# Patient Record
Sex: FEMALE | Race: WHITE | Employment: FULL TIME | URBAN - METROPOLITAN AREA
[De-identification: names, ages, dates, MRNs, and addresses within clinical notes are randomized per-mention and may not be internally consistent; named-entity substitution may affect disease eponyms.]

---

## 2018-01-01 PROCEDURE — 99282 EMERGENCY DEPT VISIT SF MDM: CPT

## 2018-01-02 ENCOUNTER — HOSPITAL ENCOUNTER (EMERGENCY)
Age: 33
Discharge: HOME | End: 2018-01-02
Attending: EMERGENCY MEDICINE
Payer: OTHER GOVERNMENT

## 2018-01-02 VITALS
HEIGHT: 66 IN | HEART RATE: 101 BPM | TEMPERATURE: 97.7 F | RESPIRATION RATE: 16 BRPM | WEIGHT: 145 LBS | OXYGEN SATURATION: 100 % | BODY MASS INDEX: 23.3 KG/M2 | DIASTOLIC BLOOD PRESSURE: 86 MMHG | SYSTOLIC BLOOD PRESSURE: 130 MMHG

## 2018-01-02 DIAGNOSIS — G43.001 MIGRAINE WITHOUT AURA AND WITH STATUS MIGRAINOSUS, NOT INTRACTABLE: Primary | ICD-10-CM

## 2018-01-02 PROCEDURE — 74011250636 HC RX REV CODE- 250/636: Performed by: EMERGENCY MEDICINE

## 2018-01-02 PROCEDURE — 96372 THER/PROPH/DIAG INJ SC/IM: CPT

## 2018-01-02 RX ORDER — SUMATRIPTAN 100 MG/1
100 TABLET, FILM COATED ORAL
COMMUNITY

## 2018-01-02 RX ORDER — SUMATRIPTAN 6 MG/.5ML
6 INJECTION, SOLUTION SUBCUTANEOUS ONCE
Status: COMPLETED | OUTPATIENT
Start: 2018-01-02 | End: 2018-01-02

## 2018-01-02 RX ORDER — MORPHINE SULFATE 4 MG/ML
4 INJECTION, SOLUTION INTRAMUSCULAR; INTRAVENOUS
Status: COMPLETED | OUTPATIENT
Start: 2018-01-02 | End: 2018-01-02

## 2018-01-02 RX ORDER — SUMATRIPTAN 6 MG/.5ML
INJECTION, SOLUTION SUBCUTANEOUS
Status: DISCONTINUED
Start: 2018-01-02 | End: 2018-01-02 | Stop reason: HOSPADM

## 2018-01-02 RX ORDER — HYDROCODONE BITARTRATE AND ACETAMINOPHEN 5; 325 MG/1; MG/1
1-2 TABLET ORAL
Qty: 15 TAB | Refills: 0 | Status: SHIPPED | OUTPATIENT
Start: 2018-01-02

## 2018-01-02 RX ADMIN — MORPHINE SULFATE 4 MG: 4 INJECTION, SOLUTION INTRAMUSCULAR; INTRAVENOUS at 00:39

## 2018-01-02 RX ADMIN — SUMATRIPTAN SUCCINATE 6 MG: 6 INJECTION, SOLUTION SUBCUTANEOUS at 01:17

## 2018-01-02 NOTE — DISCHARGE INSTRUCTIONS
Migraine Headache: Care Instructions  Your Care Instructions  Migraines are painful, throbbing headaches that often start on one side of the head. They may cause nausea and vomiting and make you sensitive to light, sound, or smell. Without treatment, migraines can last from 4 hours to a few days. Medicines can help prevent migraines or stop them after they have started. Your doctor can help you find which ones work best for you. Follow-up care is a key part of your treatment and safety. Be sure to make and go to all appointments, and call your doctor if you are having problems. It's also a good idea to know your test results and keep a list of the medicines you take. How can you care for yourself at home? · Do not drive if you have taken a prescription pain medicine. · Rest in a quiet, dark room until your headache is gone. Close your eyes, and try to relax or go to sleep. Don't watch TV or read. · Put a cold, moist cloth or cold pack on the painful area for 10 to 20 minutes at a time. Put a thin cloth between the cold pack and your skin. · Use a warm, moist towel or a heating pad set on low to relax tight shoulder and neck muscles. · Have someone gently massage your neck and shoulders. · Take your medicines exactly as prescribed. Call your doctor if you think you are having a problem with your medicine. You will get more details on the specific medicines your doctor prescribes. · Be careful not to take pain medicine more often than the instructions allow. You could get worse or more frequent headaches when the medicine wears off. To prevent migraines  · Keep a headache diary so you can figure out what triggers your headaches. Avoiding triggers may help you prevent headaches. Record when each headache began, how long it lasted, and what the pain was like.  (Was it throbbing, aching, stabbing, or dull?) Write down any other symptoms you had with the headache, such as nausea, flashing lights or dark spots, or sensitivity to bright light or loud noise. Note if the headache occurred near your period. List anything that might have triggered the headache. Triggers may include certain foods (chocolate, cheese, wine) or odors, smoke, bright light, stress, or lack of sleep. · If your doctor has prescribed medicine for your migraines, take it as directed. You may have medicine that you take only when you get a migraine and medicine that you take all the time to help prevent migraines. ¨ If your doctor has prescribed medicine for when you get a headache, take it at the first sign of a migraine, unless your doctor has given you other instructions. ¨ If your doctor has prescribed medicine to prevent migraines, take it exactly as prescribed. Call your doctor if you think you are having a problem with your medicine. · Find healthy ways to deal with stress. Migraines are most common during or right after stressful times. Take time to relax before and after you do something that has caused a migraine in the past.  · Try to keep your muscles relaxed by keeping good posture. Check your jaw, face, neck, and shoulder muscles for tension. Try to relax them. When you sit at a desk, change positions often. And make sure to stretch for 30 seconds each hour. · Get plenty of sleep and exercise. · Eat meals on a regular schedule. Avoid foods and drinks that often trigger migraines. These include chocolate, alcohol (especially red wine and port), aspartame, monosodium glutamate (MSG), and some additives found in foods (such as hot dogs, rangel, cold cuts, aged cheeses, and pickled foods). · Limit caffeine. Don't drink too much coffee, tea, or soda. But don't quit caffeine suddenly. That can also give you migraines. · Do not smoke or allow others to smoke around you. If you need help quitting, talk to your doctor about stop-smoking programs and medicines. These can increase your chances of quitting for good.   · If you are taking birth control pills or hormone therapy, talk to your doctor about whether they are triggering your migraines. When should you call for help? Call 911 anytime you think you may need emergency care. For example, call if:  ? · You have signs of a stroke. These may include:  ¨ Sudden numbness, paralysis, or weakness in your face, arm, or leg, especially on only one side of your body. ¨ Sudden vision changes. ¨ Sudden trouble speaking. ¨ Sudden confusion or trouble understanding simple statements. ¨ Sudden problems with walking or balance. ¨ A sudden, severe headache that is different from past headaches. ?Call your doctor now or seek immediate medical care if:  ? · You have new or worse nausea and vomiting. ? · You have a new or higher fever. ? · Your headache gets much worse. ? Watch closely for changes in your health, and be sure to contact your doctor if:  ? · You are not getting better after 2 days (48 hours). Where can you learn more? Go to http://brandyn-fernie.info/. Enter V534 in the search box to learn more about \"Migraine Headache: Care Instructions. \"  Current as of: October 14, 2016  Content Version: 11.4  © 2839-9901 [employer]. Care instructions adapted under license by [employer] (which disclaims liability or warranty for this information). If you have questions about a medical condition or this instruction, always ask your healthcare professional. [place] [address] any warranty or liability for your use of this information.

## 2018-01-02 NOTE — ED PROVIDER NOTES
EMERGENCY DEPARTMENT HISTORY AND PHYSICAL EXAM    12:12 AM      Date: 1/2/2018  Patient Name: Sergio Stern    History of Presenting Illness     No chief complaint on file. History Provided By: Patient    Chief Complaint: HA  Duration:  Days  Timing:  Constant  Location: generalized  Quality: Aching  Severity: Mild  Modifying Factors: No worsening or alleviating factors. No relief with Maxalt and Imitrex   Associated Symptoms: Pt denies fevers, chills, Diarrhea, and any other Sx or complaints at this time. Additional History (Context): Sergio Stern is a 34 y.o. female with Migraine who presents with headache. Pt states that she has had a constant mild aching generalized HA x 4 days with associated N/V and photophobia. Pt tried Maxalt and Imitrex with no relief. No worsening factors. Pt denies fevers, chills, Diarrhea, and any other Sx or complaints at this time. PCP: None    Current Facility-Administered Medications   Medication Dose Route Frequency Provider Last Rate Last Dose    SUMAtriptan (IMITREX) injection 6 mg  6 mg SubCUTAneous ONCE Parish Martinez MD         Current Outpatient Prescriptions   Medication Sig Dispense Refill    SUMAtriptan (IMITREX) 100 mg tablet Take 100 mg by mouth once as needed for Migraine. Indications: unknown strength      HYDROcodone-acetaminophen (NORCO) 5-325 mg per tablet Take 1-2 Tabs by mouth every six (6) hours as needed for Pain. Max Daily Amount: 8 Tabs. 15 Tab 0    rizatriptan (MAXALT) 10 mg tablet Take 10 mg by mouth once as needed for Migraine. May repeat in 2 hours if needed      norgestimate-ethinyl estradiol (ORTHO TRI-CYCLEN, 28,) 0.18/0.215/0.25 mg-35 mcg (28) tablet Take 1 Tab by mouth daily.          Past History     Past Medical History:  Past Medical History:   Diagnosis Date    Migraine        Past Surgical History:  Past Surgical History:   Procedure Laterality Date    HX WISDOM TEETH EXTRACTION         Family History:  No family history on file.    Social History:  Social History   Substance Use Topics    Smoking status: Never Smoker    Smokeless tobacco: Not on file    Alcohol use Yes      Comment: socially       Allergies: Allergies   Allergen Reactions    Aspirin Anaphylaxis    Pcn [Penicillins] Anaphylaxis    Advil [Ibuprofen] Hives    Dimetapp (Brompheniramine-Ppa) Unknown (comments)     Only when she was little         Review of Systems       Review of Systems   Constitutional: Negative. Negative for chills and fever. HENT: Negative. Negative for congestion. Eyes: Positive for photophobia. Respiratory: Negative. Negative for cough and shortness of breath. Cardiovascular: Negative. Gastrointestinal: Positive for nausea and vomiting. Negative for abdominal pain and diarrhea. Endocrine: Negative. Genitourinary: Negative. Negative for dysuria. Musculoskeletal: Negative. Negative for back pain. Skin: Negative. Negative for rash. Allergic/Immunologic: Negative. Neurological: Positive for headaches. Hematological: Negative. Psychiatric/Behavioral: Negative. All other systems reviewed and are negative. Physical Exam     Visit Vitals    /86 (BP 1 Location: Left arm, BP Patient Position: Sitting)    Pulse (!) 101    Temp 97.7 °F (36.5 °C)    Resp 16    Ht 5' 6\" (1.676 m)    Wt 65.8 kg (145 lb)    LMP 12/22/2017 (Exact Date)    SpO2 100%    BMI 23.4 kg/m2         Physical Exam   Constitutional: She is oriented to person, place, and time. She appears well-developed and well-nourished. No distress. HENT:   Head: Normocephalic. Right Ear: External ear normal.   Left Ear: External ear normal.   Mouth/Throat: No oropharyngeal exudate. Eyes: Conjunctivae and EOM are normal. Pupils are equal, round, and reactive to light. Right eye exhibits no discharge. Left eye exhibits no discharge. No scleral icterus. Neck: Normal range of motion. Neck supple. No JVD present.  No tracheal deviation present. No thyromegaly present. Cardiovascular: Normal rate, regular rhythm, normal heart sounds and intact distal pulses. Exam reveals no gallop and no friction rub. No murmur heard. Pulmonary/Chest: Effort normal and breath sounds normal. No stridor. No respiratory distress. She has no wheezes. She has no rales. She exhibits no tenderness. Abdominal: Soft. Bowel sounds are normal. She exhibits no distension and no mass. There is no tenderness. There is no rebound and no guarding. Musculoskeletal: Normal range of motion. She exhibits no edema or tenderness. Lymphadenopathy:     She has no cervical adenopathy. Neurological: She is alert and oriented to person, place, and time. She displays normal reflexes. No cranial nerve deficit. She exhibits normal muscle tone. Coordination normal.   Skin: Skin is warm and dry. No rash noted. She is not diaphoretic. No erythema. No pallor. Nursing note and vitals reviewed. Diagnostic Study Results       Medical Decision Making   I am the first provider for this patient. I reviewed the vital signs, available nursing notes, past medical history, past surgical history, family history and social history. Vital Signs-Reviewed the patient's vital signs. Records Reviewed: Nursing Notes (Time of Review: 12:12 AM)    ED Course: Progress Notes, Reevaluation, and Consults:  1:25 AM I have assessed the patient and discussed her results and diagnosis. Pt feels better. Pt is discharged is in stable condition. Patient will f/u with PCP. Patient is to return to emergency department if any new or worsening condition. Patient understands and verbalizes agreement with plan. Provider Notes (Medical Decision Making): Migraine, sinusitis, cluster headache      Diagnosis     Clinical Impression:   1.  Migraine without aura and with status migrainosus, not intractable        Disposition: discharge    Follow-up Information     Follow up With Details Comments Contact Info    your primary care physician Call in 2 days For Follow Up     [facility] Go to As needed, If symptoms worsen [address] [id number] [phone]           Patient's Medications   Start Taking    HYDROCODONE-ACETAMINOPHEN (NORCO) 5-325 MG PER TABLET    Take 1-2 Tabs by mouth every six (6) hours as needed for Pain. Max Daily Amount: 8 Tabs. Continue Taking    NORGESTIMATE-ETHINYL ESTRADIOL (ORTHO TRI-CYCLEN, 28,) 0.18/0.215/0.25 MG-35 MCG (28) TABLET    Take 1 Tab by mouth daily. RIZATRIPTAN (MAXALT) 10 MG TABLET    Take 10 mg by mouth once as needed for Migraine. May repeat in 2 hours if needed    SUMATRIPTAN (IMITREX) 100 MG TABLET    Take 100 mg by mouth once as needed for Migraine. Indications: unknown strength   These Medications have changed    No medications on file   Stop Taking    No medications on file     _______________________________    Attestations:  Scribe Attestation     Mary Rodriguez acting as a scribe for and in the presence of Wilmer Masters MD      January 02, 2018 at 12:12 AM       Provider Attestation:      I personally performed the services described in the documentation, reviewed the documentation, as recorded by the scribe in my presence, and it accurately and completely records my words and actions.  January 02, 2018 at 12:12 AM - Wilmer Masters MD    _______________________________